# Patient Record
Sex: FEMALE | Race: WHITE | NOT HISPANIC OR LATINO
[De-identification: names, ages, dates, MRNs, and addresses within clinical notes are randomized per-mention and may not be internally consistent; named-entity substitution may affect disease eponyms.]

---

## 2021-04-20 PROBLEM — Z00.00 ENCOUNTER FOR PREVENTIVE HEALTH EXAMINATION: Status: ACTIVE | Noted: 2021-04-20

## 2021-05-07 ENCOUNTER — APPOINTMENT (OUTPATIENT)
Dept: PEDIATRIC MEDICAL GENETICS | Facility: CLINIC | Age: 31
End: 2021-05-07
Payer: COMMERCIAL

## 2021-05-07 DIAGNOSIS — Z87.42 PERSONAL HISTORY OF OTHER DISEASES OF THE FEMALE GENITAL TRACT: ICD-10-CM

## 2021-05-07 DIAGNOSIS — Z14.8 GENETIC CARRIER OF OTHER DISEASE: ICD-10-CM

## 2021-05-07 PROCEDURE — ZZZZZ: CPT

## 2021-05-14 PROBLEM — Z87.42 HISTORY OF MENORRHAGIA: Status: RESOLVED | Noted: 2021-05-14 | Resolved: 2021-05-14

## 2021-05-14 PROBLEM — Z14.8 CARRIER OF GENETIC DISORDER: Status: ACTIVE | Noted: 2021-05-14

## 2022-11-17 ENCOUNTER — OUTPATIENT (OUTPATIENT)
Dept: OUTPATIENT SERVICES | Facility: HOSPITAL | Age: 32
LOS: 1 days | End: 2022-11-17

## 2022-11-17 ENCOUNTER — APPOINTMENT (OUTPATIENT)
Dept: HUMAN REPRODUCTION | Facility: CLINIC | Age: 32
End: 2022-11-17

## 2022-11-17 ENCOUNTER — APPOINTMENT (OUTPATIENT)
Dept: MAMMOGRAPHY | Facility: CLINIC | Age: 32
End: 2022-11-17

## 2022-11-17 DIAGNOSIS — Z00.00 ENCOUNTER FOR GENERAL ADULT MEDICAL EXAMINATION WITHOUT ABNORMAL FINDINGS: ICD-10-CM

## 2023-04-12 ENCOUNTER — TRANSCRIPTION ENCOUNTER (OUTPATIENT)
Age: 33
End: 2023-04-12

## 2023-07-12 ENCOUNTER — TRANSCRIPTION ENCOUNTER (OUTPATIENT)
Age: 33
End: 2023-07-12

## 2023-07-13 ENCOUNTER — INPATIENT (INPATIENT)
Facility: HOSPITAL | Age: 33
LOS: 1 days | Discharge: HOME | End: 2023-07-15
Attending: OBSTETRICS & GYNECOLOGY | Admitting: OBSTETRICS & GYNECOLOGY
Payer: COMMERCIAL

## 2023-07-13 VITALS — HEART RATE: 82 BPM | OXYGEN SATURATION: 100 %

## 2023-07-13 DIAGNOSIS — Z34.80 ENCOUNTER FOR SUPERVISION OF OTHER NORMAL PREGNANCY, UNSPECIFIED TRIMESTER: ICD-10-CM

## 2023-07-13 DIAGNOSIS — O26.899 OTHER SPECIFIED PREGNANCY RELATED CONDITIONS, UNSPECIFIED TRIMESTER: ICD-10-CM

## 2023-07-13 LAB
BASOPHILS # BLD AUTO: 0 K/UL — SIGNIFICANT CHANGE UP (ref 0–0.2)
BASOPHILS NFR BLD AUTO: 0 % — SIGNIFICANT CHANGE UP (ref 0–2)
EOSINOPHIL # BLD AUTO: 0.09 K/UL — SIGNIFICANT CHANGE UP (ref 0–0.5)
EOSINOPHIL NFR BLD AUTO: 0.8 % — SIGNIFICANT CHANGE UP (ref 0–6)
HCT VFR BLD CALC: 36.6 % — SIGNIFICANT CHANGE UP (ref 34.5–45)
HGB BLD-MCNC: 12.7 G/DL — SIGNIFICANT CHANGE UP (ref 11.5–15.5)
LYMPHOCYTES # BLD AUTO: 27.6 % — SIGNIFICANT CHANGE UP (ref 13–44)
LYMPHOCYTES # BLD AUTO: 3.09 K/UL — SIGNIFICANT CHANGE UP (ref 1–3.3)
MANUAL SMEAR VERIFICATION: SIGNIFICANT CHANGE UP
MCHC RBC-ENTMCNC: 32.2 PG — SIGNIFICANT CHANGE UP (ref 27–34)
MCHC RBC-ENTMCNC: 34.7 GM/DL — SIGNIFICANT CHANGE UP (ref 32–36)
MCV RBC AUTO: 92.7 FL — SIGNIFICANT CHANGE UP (ref 80–100)
METAMYELOCYTES # FLD: 0.9 % — HIGH (ref 0–0)
MONOCYTES # BLD AUTO: 0.96 K/UL — HIGH (ref 0–0.9)
MONOCYTES NFR BLD AUTO: 8.6 % — SIGNIFICANT CHANGE UP (ref 2–14)
NEUTROPHILS # BLD AUTO: 6.94 K/UL — SIGNIFICANT CHANGE UP (ref 1.8–7.4)
NEUTROPHILS NFR BLD AUTO: 62.1 % — SIGNIFICANT CHANGE UP (ref 43–77)
PLAT MORPH BLD: NORMAL — SIGNIFICANT CHANGE UP
PLATELET # BLD AUTO: 155 K/UL — SIGNIFICANT CHANGE UP (ref 150–400)
RBC # BLD: 3.95 M/UL — SIGNIFICANT CHANGE UP (ref 3.8–5.2)
RBC # FLD: 12.3 % — SIGNIFICANT CHANGE UP (ref 10.3–14.5)
RBC BLD AUTO: SIGNIFICANT CHANGE UP
T PALLIDUM AB TITR SER: NEGATIVE — SIGNIFICANT CHANGE UP
WBC # BLD: 11.18 K/UL — HIGH (ref 3.8–10.5)
WBC # FLD AUTO: 11.18 K/UL — HIGH (ref 3.8–10.5)

## 2023-07-13 PROCEDURE — 74018 RADEX ABDOMEN 1 VIEW: CPT | Mod: 26

## 2023-07-13 RX ORDER — HYDROMORPHONE HYDROCHLORIDE 2 MG/ML
30 INJECTION INTRAMUSCULAR; INTRAVENOUS; SUBCUTANEOUS
Refills: 0 | Status: DISCONTINUED | OUTPATIENT
Start: 2023-07-13 | End: 2023-07-14

## 2023-07-13 RX ORDER — KETOROLAC TROMETHAMINE 30 MG/ML
30 SYRINGE (ML) INJECTION EVERY 6 HOURS
Refills: 0 | Status: DISCONTINUED | OUTPATIENT
Start: 2023-07-13 | End: 2023-07-14

## 2023-07-13 RX ORDER — SODIUM CHLORIDE 9 MG/ML
1000 INJECTION, SOLUTION INTRAVENOUS
Refills: 0 | Status: DISCONTINUED | OUTPATIENT
Start: 2023-07-13 | End: 2023-07-13

## 2023-07-13 RX ORDER — CITRIC ACID/SODIUM CITRATE 300-500 MG
15 SOLUTION, ORAL ORAL EVERY 6 HOURS
Refills: 0 | Status: DISCONTINUED | OUTPATIENT
Start: 2023-07-13 | End: 2023-07-13

## 2023-07-13 RX ORDER — MAGNESIUM HYDROXIDE 400 MG/1
30 TABLET, CHEWABLE ORAL
Refills: 0 | Status: DISCONTINUED | OUTPATIENT
Start: 2023-07-13 | End: 2023-07-15

## 2023-07-13 RX ORDER — DIPHENHYDRAMINE HCL 50 MG
25 CAPSULE ORAL EVERY 6 HOURS
Refills: 0 | Status: DISCONTINUED | OUTPATIENT
Start: 2023-07-13 | End: 2023-07-15

## 2023-07-13 RX ORDER — IBUPROFEN 200 MG
600 TABLET ORAL EVERY 6 HOURS
Refills: 0 | Status: COMPLETED | OUTPATIENT
Start: 2023-07-13 | End: 2024-06-10

## 2023-07-13 RX ORDER — TETANUS TOXOID, REDUCED DIPHTHERIA TOXOID AND ACELLULAR PERTUSSIS VACCINE, ADSORBED 5; 2.5; 8; 8; 2.5 [IU]/.5ML; [IU]/.5ML; UG/.5ML; UG/.5ML; UG/.5ML
0.5 SUSPENSION INTRAMUSCULAR ONCE
Refills: 0 | Status: DISCONTINUED | OUTPATIENT
Start: 2023-07-13 | End: 2023-07-15

## 2023-07-13 RX ORDER — ACETAMINOPHEN 500 MG
975 TABLET ORAL
Refills: 0 | Status: DISCONTINUED | OUTPATIENT
Start: 2023-07-13 | End: 2023-07-15

## 2023-07-13 RX ORDER — SIMETHICONE 80 MG/1
80 TABLET, CHEWABLE ORAL EVERY 4 HOURS
Refills: 0 | Status: DISCONTINUED | OUTPATIENT
Start: 2023-07-13 | End: 2023-07-15

## 2023-07-13 RX ORDER — LANOLIN
1 OINTMENT (GRAM) TOPICAL EVERY 6 HOURS
Refills: 0 | Status: DISCONTINUED | OUTPATIENT
Start: 2023-07-13 | End: 2023-07-15

## 2023-07-13 RX ORDER — OXYCODONE HYDROCHLORIDE 5 MG/1
5 TABLET ORAL
Refills: 0 | Status: COMPLETED | OUTPATIENT
Start: 2023-07-13 | End: 2023-07-20

## 2023-07-13 RX ORDER — CEFAZOLIN SODIUM 1 G
2000 VIAL (EA) INJECTION EVERY 8 HOURS
Refills: 0 | Status: COMPLETED | OUTPATIENT
Start: 2023-07-13 | End: 2023-07-14

## 2023-07-13 RX ORDER — OXYTOCIN 10 UNIT/ML
333.33 VIAL (ML) INJECTION
Qty: 20 | Refills: 0 | Status: DISCONTINUED | OUTPATIENT
Start: 2023-07-13 | End: 2023-07-15

## 2023-07-13 RX ORDER — HEPARIN SODIUM 5000 [USP'U]/ML
5000 INJECTION INTRAVENOUS; SUBCUTANEOUS EVERY 12 HOURS
Refills: 0 | Status: DISCONTINUED | OUTPATIENT
Start: 2023-07-13 | End: 2023-07-15

## 2023-07-13 RX ORDER — CHLORHEXIDINE GLUCONATE 213 G/1000ML
1 SOLUTION TOPICAL DAILY
Refills: 0 | Status: DISCONTINUED | OUTPATIENT
Start: 2023-07-13 | End: 2023-07-13

## 2023-07-13 RX ADMIN — Medication 975 MILLIGRAM(S): at 23:21

## 2023-07-13 RX ADMIN — Medication 30 MILLIGRAM(S): at 21:56

## 2023-07-13 RX ADMIN — Medication 100 MILLIGRAM(S): at 21:57

## 2023-07-13 RX ADMIN — Medication 30 MILLIGRAM(S): at 15:44

## 2023-07-13 RX ADMIN — Medication 0.25 MILLIGRAM(S): at 05:35

## 2023-07-13 RX ADMIN — HYDROMORPHONE HYDROCHLORIDE 30 MILLILITER(S): 2 INJECTION INTRAMUSCULAR; INTRAVENOUS; SUBCUTANEOUS at 08:09

## 2023-07-13 RX ADMIN — HEPARIN SODIUM 5000 UNIT(S): 5000 INJECTION INTRAVENOUS; SUBCUTANEOUS at 15:51

## 2023-07-13 RX ADMIN — Medication 30 MILLIGRAM(S): at 15:14

## 2023-07-13 RX ADMIN — Medication 100 MILLIGRAM(S): at 14:29

## 2023-07-13 RX ADMIN — Medication 30 MILLIGRAM(S): at 22:26

## 2023-07-13 RX ADMIN — Medication 975 MILLIGRAM(S): at 23:51

## 2023-07-13 RX ADMIN — HYDROMORPHONE HYDROCHLORIDE 30 MILLILITER(S): 2 INJECTION INTRAMUSCULAR; INTRAVENOUS; SUBCUTANEOUS at 12:55

## 2023-07-13 RX ADMIN — Medication 0.25 MILLIGRAM(S): at 05:33

## 2023-07-13 RX ADMIN — Medication 30 MILLIGRAM(S): at 09:28

## 2023-07-13 RX ADMIN — Medication 975 MILLIGRAM(S): at 18:09

## 2023-07-13 RX ADMIN — Medication 30 MILLIGRAM(S): at 10:15

## 2023-07-13 RX ADMIN — Medication 975 MILLIGRAM(S): at 17:39

## 2023-07-13 NOTE — OB NEONATOLOGY/PEDIATRICIAN DELIVERY SUMMARY - NSPEDSNEONOTESA_OBGYN_ALL_OB_FT
[facility] called to OR for emergency CS due to fetal bradycardia. As per OR RN report: 39.3 wk AGA male born via CS on 2023@0545 to a 34 y/o  mother. Maternal labs include Blood Type O-, HIV - , RPR NR , Rubella I , Hep B - , GBS+ 2023 (not treated, precipitous delivery). SROM at 0515 with clear fluids (ROM hours:0H30M). Baby emerged vigorous, crying, was warmed, dried suctioned and stimulated with APGARS of 8/9. Mom plans to initiate breastfeeding/ formula feed, consents Hep B vaccine and consents circ.  Highest maternal temp: 36.7 C. EOS 0.06. Admitted under Dr. Aguilera. Outpatient PMD: [place], [place], [facility].      Physical Exam:  Gen: no acute distress, +grimace  HEENT:  anterior fontanel open soft and flat, nondysmorphic facies, no cleft lip/palate, ears normal set, no ear pits or tags, nares clinically patent  Resp: Normal respiratory effort without grunting or retractions, good air entry b/l, clear to auscultation bilaterally  Cardio: Present S1/S2, regular rate and rhythm, no murmurs  Abd: soft, non tender, non distended, umbilical cord with 3 vessels  Neuro: +palmar and plantar grasp, +suck, +[place], normal tone  Extremities/musculoskeletal: negative Healy and Ortolani maneuvers, moving all extremities, no clavicular crepitus or stepoff  Skin: pink, warm, +right nostril small abrasion  Genitals: Normal male anatomy, testicles palpable in scrotum b/l, +hydrocele b/l, Alexey 1, anus patent

## 2023-07-13 NOTE — OB PROVIDER DELIVERY SUMMARY - NSSELHIDDEN_OBGYN_ALL_OB_FT
[NS_DeliveryAttending1_OBGYN_ALL_OB_FT:[email or web address]=],[NS_DeliveryAttending2_OBGYN_ALL_OB_FT:[id number]=],[NS_DeliveryRN_OBGYN_ALL_OB_FT:[id number]=],[NS_DeliveryAssist2_OBGYN_ALL_OB_FT:[id number]=]

## 2023-07-13 NOTE — OB PROVIDER H&P - NSHPPHYSICALEXAM_GEN_ALL_CORE
Vital Signs Last 24 Hrs  T(C): --  T(F): --  HR: 82 (13 Jul 2023 06:00) (75 - 82)  BP: --  BP(mean): --  RR: --  SpO2: 100% (13 Jul 2023 06:00) (78% - 100%)

## 2023-07-13 NOTE — OB RN INTRAOPERATIVE NOTE - NSSELHIDDEN_OBGYN_ALL_OB_FT
[NS_DeliveryAttending1_OBGYN_ALL_OB_FT:[id number]=],[NS_DeliveryAttending2_OBGYN_ALL_OB_FT:[id number]=],[NS_DeliveryRN_OBGYN_ALL_OB_FT:[id number]=] [NS_DeliveryAttending1_OBGYN_ALL_OB_FT:[email or web address]=],[NS_DeliveryAttending2_OBGYN_ALL_OB_FT:[id number]=],[NS_DeliveryRN_OBGYN_ALL_OB_FT:[id number]=],[NS_DeliveryAssist2_OBGYN_ALL_OB_FT:[id number]=]

## 2023-07-13 NOTE — OB RN DELIVERY SUMMARY - BABY A: APGAR 1 MIN MUSCLE TONE, DELIVERY
normal tone, no external hemorrhoids, no masses palpable, no red blood, Tenderness on DENITA, Internal hemorrhoids present (2) well flexed

## 2023-07-13 NOTE — OB PROVIDER DELIVERY SUMMARY - NSPROVIDERDELIVERYNOTE_OBGYN_ALL_OB_FT
emergent pLTCS under GETA for prolonged bradycardia     Viable male infant. APGARS 8/9. 2555g  Grossly normal uterus, bilateral tubes, and ovaries  Hysterotomy closed in x1 layer w/ 1-0 PDS ***  Bladder backfilled w/ 150cc of Methylene blue w/ no extravasation noted   Peritoneum incorporating rectus muscle was reapproximated w/ 2-0 Vicryl   Fascia reapproximated w/ 1-0 Vicryl in a running fashion   SubQ reapproximated w/ 2-0 Vicryl in a running fashion w/ a deep-dermal layer   Skin reapproximated w/ Quill  in a subcuticular fashion     _/_/_  - Patient additionally given IM Methergine x1     Of note, count was correct x2. However, intra-op x-ray was obtained given emergent nature. X-ray clear per radiology  Dictation #: emergent pLTCS under GETA for prolonged bradycardia   - Delivery of the  was done by service attending, Dr. Venegas given emergent nature. Primary attending, Dr. ABNER Miller was presented and took over case after  had been delivered     Viable male infant. APGARS 8/9. 2555g  Hysterotomy closed in x1 layer w/ 1-0 PDS  Bladder backfilled w/ 150cc of Methylene blue w/ no extravasation noted   Peritoneum incorporating rectus muscle was reapproximated w/ 2-0 Vicryl   Fascia reapproximated w/ 1-0 Vicryl in a running fashion   SubQ reapproximated w/ 2-0 Vicryl in a running fashion w/ a deep-dermal layer   Skin reapproximated w/ Quill  in a subcuticular fashion     800/2200/150    - Patient additionally given IM Methergine x1     Of note, count was correct x2. However, intra-op x-ray was obtained given emergent nature. X-ray clear per radiology    Dictation #:

## 2023-07-13 NOTE — OB RN DELIVERY SUMMARY - NSSELHIDDEN_OBGYN_ALL_OB_FT
[NS_DeliveryAttending1_OBGYN_ALL_OB_FT:[email or web address]=],[NS_DeliveryAttending2_OBGYN_ALL_OB_FT:[id number]=],[NS_DeliveryRN_OBGYN_ALL_OB_FT:[id number]=]

## 2023-07-13 NOTE — OB PROVIDER H&P - ASSESSMENT
32yo  @39w3d presenting for active labor. Minutes after arrival, patient had a 7 min prolonged decel to 60bpm   - ISE placed  - Terbutaline x2 administered  - Patient placed on hands and knees  - Patient instructed to push and made minimal progress  - Patient taken for emergent  section under general anesthesia  - See delivery note for details    Maty Mayes, PGY2  Patient seen and examined with Dr. Venegas and Georgette Oconnell, PGY4   d/w Dr. Pace

## 2023-07-13 NOTE — OB PROVIDER LABOR PROGRESS NOTE - NS_SUBJECTIVE/OBJECTIVE_OBGYN_ALL_OB_FT
:  34yo P0 pt of Dr Erazo's presented with urge to push. Examined and found to be FD@-1 station. Dr garcia informed and en route.  When she was placed on EFM, large decelerations were noted with persistent, painful, unyielding cntrx pain. FSE applied. 2 doses of SQ terb given to break cntrx pattern and pt placed in knee chest position. Despite multiple resuscitative measures, fetal heart rate remained down. pt stated that she needed to push, but being a P0 was unable to bring the fetal vertex down rapidly enough to deliver with fetal bradycardia. I therefore decided to proceed with emergent primary C/S. Pt rushed to OR and after GA induction, an emergent L/T C/S was performed with delivery of a vigorous live baby boy covered in dark red blood, handed to awaiting peds, Apgars 8,9. Placenta delivered manually with a large amount of port wine blood delivering after placenta. Uterus was then closed in 1 layer with 1 caprosyn. At this point Dr Garcia scrubbed in and completed the closure. There was no instrument count because of the emergent nature of the C/S, so an XRay will be performed at the conclusion of the surgery.

## 2023-07-13 NOTE — OB PROVIDER H&P - HISTORY OF PRESENT ILLNESS
32yo  @39w3d  p/f ROL. She began delfino at 3:30am. She felt a gush of fluid at 5:15am. +VB, +FM. She is delfino uncomfortably on arrival  PNC: [facility]  GBS: pos  EFW: 8#  ObHx:   GynHx: -c/f/STI/abnl pap  MedHx: denies  SrgHx: denies  PsychHx: denies  SocialHx: denies T/E/D  AllergyHx: denies  RxHx: denies

## 2023-07-14 LAB
BASOPHILS # BLD AUTO: 0.03 K/UL — SIGNIFICANT CHANGE UP (ref 0–0.2)
BASOPHILS NFR BLD AUTO: 0.2 % — SIGNIFICANT CHANGE UP (ref 0–2)
EOSINOPHIL # BLD AUTO: 0.05 K/UL — SIGNIFICANT CHANGE UP (ref 0–0.5)
EOSINOPHIL NFR BLD AUTO: 0.4 % — SIGNIFICANT CHANGE UP (ref 0–6)
HCT VFR BLD CALC: 25.8 % — LOW (ref 34.5–45)
HGB BLD-MCNC: 8.9 G/DL — LOW (ref 11.5–15.5)
IMM GRANULOCYTES NFR BLD AUTO: 0.8 % — SIGNIFICANT CHANGE UP (ref 0–0.9)
KLEIHAUER-BETKE CALCULATION: 0 % — SIGNIFICANT CHANGE UP (ref 0–0.3)
LYMPHOCYTES # BLD AUTO: 16.6 % — SIGNIFICANT CHANGE UP (ref 13–44)
LYMPHOCYTES # BLD AUTO: 2.14 K/UL — SIGNIFICANT CHANGE UP (ref 1–3.3)
MCHC RBC-ENTMCNC: 32.4 PG — SIGNIFICANT CHANGE UP (ref 27–34)
MCHC RBC-ENTMCNC: 34.5 GM/DL — SIGNIFICANT CHANGE UP (ref 32–36)
MCV RBC AUTO: 93.8 FL — SIGNIFICANT CHANGE UP (ref 80–100)
MONOCYTES # BLD AUTO: 0.7 K/UL — SIGNIFICANT CHANGE UP (ref 0–0.9)
MONOCYTES NFR BLD AUTO: 5.4 % — SIGNIFICANT CHANGE UP (ref 2–14)
NEUTROPHILS # BLD AUTO: 9.87 K/UL — HIGH (ref 1.8–7.4)
NEUTROPHILS NFR BLD AUTO: 76.6 % — SIGNIFICANT CHANGE UP (ref 43–77)
NRBC # BLD: 0 /100 WBCS — SIGNIFICANT CHANGE UP (ref 0–0)
PLATELET # BLD AUTO: 118 K/UL — LOW (ref 150–400)
RBC # BLD: 2.75 M/UL — LOW (ref 3.8–5.2)
RBC # FLD: 12.5 % — SIGNIFICANT CHANGE UP (ref 10.3–14.5)
WBC # BLD: 12.89 K/UL — HIGH (ref 3.8–10.5)
WBC # FLD AUTO: 12.89 K/UL — HIGH (ref 3.8–10.5)

## 2023-07-14 RX ORDER — IBUPROFEN 200 MG
600 TABLET ORAL EVERY 6 HOURS
Refills: 0 | Status: DISCONTINUED | OUTPATIENT
Start: 2023-07-14 | End: 2023-07-15

## 2023-07-14 RX ORDER — ASCORBIC ACID 60 MG
500 TABLET,CHEWABLE ORAL THREE TIMES A DAY
Refills: 0 | Status: DISCONTINUED | OUTPATIENT
Start: 2023-07-14 | End: 2023-07-15

## 2023-07-14 RX ORDER — FERROUS SULFATE 325(65) MG
325 TABLET ORAL THREE TIMES A DAY
Refills: 0 | Status: DISCONTINUED | OUTPATIENT
Start: 2023-07-14 | End: 2023-07-15

## 2023-07-14 RX ADMIN — SIMETHICONE 80 MILLIGRAM(S): 80 TABLET, CHEWABLE ORAL at 07:01

## 2023-07-14 RX ADMIN — HYDROMORPHONE HYDROCHLORIDE 30 MILLILITER(S): 2 INJECTION INTRAMUSCULAR; INTRAVENOUS; SUBCUTANEOUS at 07:00

## 2023-07-14 RX ADMIN — Medication 30 MILLIGRAM(S): at 03:52

## 2023-07-14 RX ADMIN — Medication 975 MILLIGRAM(S): at 18:13

## 2023-07-14 RX ADMIN — Medication 325 MILLIGRAM(S): at 21:30

## 2023-07-14 RX ADMIN — Medication 100 MILLIGRAM(S): at 14:54

## 2023-07-14 RX ADMIN — Medication 600 MILLIGRAM(S): at 15:55

## 2023-07-14 RX ADMIN — Medication 975 MILLIGRAM(S): at 07:21

## 2023-07-14 RX ADMIN — Medication 600 MILLIGRAM(S): at 10:05

## 2023-07-14 RX ADMIN — Medication 500 MILLIGRAM(S): at 21:30

## 2023-07-14 RX ADMIN — Medication 975 MILLIGRAM(S): at 06:43

## 2023-07-14 RX ADMIN — Medication 600 MILLIGRAM(S): at 21:31

## 2023-07-14 RX ADMIN — Medication 600 MILLIGRAM(S): at 09:06

## 2023-07-14 RX ADMIN — Medication 325 MILLIGRAM(S): at 14:55

## 2023-07-14 RX ADMIN — HEPARIN SODIUM 5000 UNIT(S): 5000 INJECTION INTRAVENOUS; SUBCUTANEOUS at 03:22

## 2023-07-14 RX ADMIN — Medication 600 MILLIGRAM(S): at 22:00

## 2023-07-14 RX ADMIN — HEPARIN SODIUM 5000 UNIT(S): 5000 INJECTION INTRAVENOUS; SUBCUTANEOUS at 14:54

## 2023-07-14 RX ADMIN — Medication 30 MILLIGRAM(S): at 03:22

## 2023-07-14 RX ADMIN — Medication 600 MILLIGRAM(S): at 14:55

## 2023-07-14 RX ADMIN — Medication 975 MILLIGRAM(S): at 19:00

## 2023-07-14 RX ADMIN — Medication 975 MILLIGRAM(S): at 12:38

## 2023-07-14 RX ADMIN — Medication 500 MILLIGRAM(S): at 14:55

## 2023-07-14 RX ADMIN — Medication 100 MILLIGRAM(S): at 06:43

## 2023-07-14 RX ADMIN — Medication 975 MILLIGRAM(S): at 13:35

## 2023-07-14 NOTE — PROGRESS NOTE ADULT - SUBJECTIVE AND OBJECTIVE BOX
Day 1 of Anesthesia Pain Management Service    SUBJECTIVE: Doing well    Pain Scale Score:	[X] Refer to charted pain scores    THERAPY:    [ ] IV PCA Morphine		        [ ] 5 mg/mL	[ ] 1 mg/mL  [X] IV PCA Hydromorphone	[ ] 5 mg/mL	[X] 1 mg/mL  [ ] IV PCA Fentanyl		        [ ] 50 micrograms/mL    Demand dose: 0.2 mg     Lockout: 6 minutes   Continuous Rate: 0 mg/hr  4 Hour Limit: 4 mg    MEDICATIONS  (STANDING):  acetaminophen     Tablet .. 975 milliGRAM(s) Oral <User Schedule>  ascorbic acid 500 milliGRAM(s) Oral three times a day  ceFAZolin   IVPB 2000 milliGRAM(s) IV Intermittent every 8 hours  diphtheria/tetanus/pertussis (acellular) Vaccine (Adacel) 0.5 milliLiter(s) IntraMuscular once  ferrous    sulfate 325 milliGRAM(s) Oral three times a day  heparin   Injectable 5000 Unit(s) SubCutaneous every 12 hours  ibuprofen  Tablet. 600 milliGRAM(s) Oral every 6 hours  measles/mumps/rubella Vaccine 0.5 milliLiter(s) SubCutaneous once  oxytocin Infusion 333.333 milliUNIT(s)/Min (1000 mL/Hr) IV Continuous <Continuous>    MEDICATIONS  (PRN):  diphenhydrAMINE 25 milliGRAM(s) Oral every 6 hours PRN Pruritus  lanolin Ointment 1 Application(s) Topical every 6 hours PRN Sore Nipples  magnesium hydroxide Suspension 30 milliLiter(s) Oral two times a day PRN Constipation  oxyCODONE    IR 5 milliGRAM(s) Oral every 3 hours PRN Moderate to Severe Pain (4-10)  simethicone 80 milliGRAM(s) Chew every 4 hours PRN Gas      OBJECTIVE:    Sedation Score:	[ X] Alert	 [ ] Drowsy 	[ ] Arousable	[ ] Asleep	[ ] Unresponsive    Side Effects:	[X ] None	[ ] Nausea	[ ] Vomiting	[ ] Pruritus  		[ ] Other:    Vital Signs Last 24 Hrs  T(C): 36.9 (14 Jul 2023 06:22), Max: 37.4 (13 Jul 2023 13:30)  T(F): 98.5 (14 Jul 2023 06:22), Max: 99.3 (13 Jul 2023 13:30)  HR: 73 (14 Jul 2023 06:22) (73 - 85)  BP: 107/72 (14 Jul 2023 06:22) (107/72 - 129/80)  BP(mean): 92 (13 Jul 2023 11:20) (92 - 92)  RR: 18 (14 Jul 2023 06:22) (16 - 18)  SpO2: 99% (14 Jul 2023 06:22) (97% - 99%)    Parameters below as of 14 Jul 2023 06:22  Patient On (Oxygen Delivery Method): room air        ASSESSMENT/ PLAN    Therapy to  be:               [  ] Continued   [X ] Discontinued   [ X] Changed to PRN Analgesics    Documentation and Verification of current medications:   [X] Done	[ ] Not done, not eligible    Comments: PCA D\C'd. Transitioned to prn analgesics

## 2023-07-14 NOTE — PROGRESS NOTE ADULT - PROBLEM SELECTOR PLAN 1
#RH negative  check KB  for Rhogam    #Postpartum state  Increase OOB  PO Pain Protocol  DVT ppx  Regular diet  AM CBC  Routine Postpartum/Post-op care #RH negative, Baby RH positive  check KB  for Rhogam    #Postpartum state  Increase OOB  PO Pain Protocol  DVT ppx  Regular diet  AM CBC  Routine Postpartum/Post-op care

## 2023-07-14 NOTE — CHART NOTE - NSCHARTNOTEFT_GEN_A_CORE
PA NOTE        POD#1         Vital Signs Last 24 Hrs  T(C): 36.9 (2023 06:22), Max: 37.4 (2023 13:30)  T(F): 98.5 (2023 06:22), Max: 99.3 (2023 13:30)  HR: 73 (2023 06:22) (73 - 86)  BP: 107/72 (2023 06:22) (107/72 - 136/76)  BP(mean): 92 (2023 11:20) (91 - 100)  RR: 18 (2023 06:22) (16 - 18)  SpO2: 99% (2023 06:22) (97% - 100%)    Parameters below as of 2023 06:22  Patient On (Oxygen Delivery Method): room air                   8.9    12.89 )-----------( 118      ( 14 @ 06:42 )             25.8                12.7   11.18 )-----------( 155      ( 13 @ 05:51 )             36.6           Assessment: Anemia secondary to acute blood loss due to delivery    Plan:  - Ferrous Sulfate, Vitamin C supplementation.  - Monitor for signs/symptoms of anemia.   - Does not require transfusion at this time

## 2023-07-14 NOTE — PROGRESS NOTE ADULT - ATTENDING COMMENTS
Agree with assessment and plan. Pt doing well without complaints.   Vitals stable. Exam Benign  - iron supp for asymptomatic anemia  - Routine post partum care

## 2023-07-14 NOTE — PROGRESS NOTE ADULT - SUBJECTIVE AND OBJECTIVE BOX
Postpartum Note-  Section POD#1    Prenatal Labs  Blood type: O Negative  Rubella IgG: Immune  RPR: Negative          S:Patient w/o complaints, pain is controlled.  Pt is OOB, tolerating PO, passing flatus. Voiding. Lochia WNL.     O:  Vital Signs Last 24 Hrs  T(C): 36.9 (2023 06:22), Max: 37.4 (2023 13:30)  T(F): 98.5 (2023 06:22), Max: 99.3 (2023 13:30)  HR: 73 (2023 06:22) (73 - 120)  BP: 107/72 (2023 06:22) (107/72 - 136/76)  BP(mean): 92 (2023 11:20) (81 - 100)  RR: 18 (2023 06:22) (16 - 20)  SpO2: 99% (2023 06:22) (97% - 100%)    Parameters below as of 2023 06:22  Patient On (Oxygen Delivery Method): room air      I&O's Summary    2023 07:01  -  2023 07:00  --------------------------------------------------------  IN: 250 mL / OUT: 1400 mL / NET: -1150 mL        Gen: NAD  Abdomen: appropriate tenderness, softly distended, fundus firm.  Incision: Clean/dry/ intact. no erythema, no ecchymosis   Sub Q   Lochia WNL  Ext:Nontender    LABS:             12.7   11.18 )-----------( 155      ( 0713 @ 05:51 )             36.6

## 2023-07-15 ENCOUNTER — TRANSCRIPTION ENCOUNTER (OUTPATIENT)
Age: 33
End: 2023-07-15

## 2023-07-15 VITALS
OXYGEN SATURATION: 100 % | SYSTOLIC BLOOD PRESSURE: 126 MMHG | TEMPERATURE: 98 F | DIASTOLIC BLOOD PRESSURE: 86 MMHG | HEART RATE: 85 BPM | RESPIRATION RATE: 18 BRPM

## 2023-07-15 DIAGNOSIS — D62 ACUTE POSTHEMORRHAGIC ANEMIA: ICD-10-CM

## 2023-07-15 DIAGNOSIS — M54.9 DORSALGIA, UNSPECIFIED: ICD-10-CM

## 2023-07-15 PROCEDURE — C9399: CPT

## 2023-07-15 PROCEDURE — 85025 COMPLETE CBC W/AUTO DIFF WBC: CPT

## 2023-07-15 PROCEDURE — 86780 TREPONEMA PALLIDUM: CPT

## 2023-07-15 PROCEDURE — 74018 RADEX ABDOMEN 1 VIEW: CPT

## 2023-07-15 PROCEDURE — 59025 FETAL NON-STRESS TEST: CPT

## 2023-07-15 PROCEDURE — 59050 FETAL MONITOR W/REPORT: CPT

## 2023-07-15 PROCEDURE — 86850 RBC ANTIBODY SCREEN: CPT

## 2023-07-15 PROCEDURE — 86900 BLOOD TYPING SEROLOGIC ABO: CPT

## 2023-07-15 PROCEDURE — 85460 HEMOGLOBIN FETAL: CPT

## 2023-07-15 PROCEDURE — 36415 COLL VENOUS BLD VENIPUNCTURE: CPT

## 2023-07-15 PROCEDURE — 86901 BLOOD TYPING SEROLOGIC RH(D): CPT

## 2023-07-15 PROCEDURE — 90707 MMR VACCINE SC: CPT

## 2023-07-15 RX ORDER — OXYCODONE HYDROCHLORIDE 5 MG/1
1 TABLET ORAL
Qty: 5 | Refills: 0
Start: 2023-07-15 | End: 2023-07-16

## 2023-07-15 RX ORDER — OXYCODONE HYDROCHLORIDE 5 MG/1
5 TABLET ORAL
Refills: 0 | Status: DISCONTINUED | OUTPATIENT
Start: 2023-07-15 | End: 2023-07-15

## 2023-07-15 RX ORDER — IBUPROFEN 200 MG
1 TABLET ORAL
Qty: 0 | Refills: 0 | DISCHARGE
Start: 2023-07-15

## 2023-07-15 RX ORDER — ACETAMINOPHEN 500 MG
3 TABLET ORAL
Qty: 0 | Refills: 0 | DISCHARGE
Start: 2023-07-15

## 2023-07-15 RX ADMIN — OXYCODONE HYDROCHLORIDE 5 MILLIGRAM(S): 5 TABLET ORAL at 15:15

## 2023-07-15 RX ADMIN — Medication 600 MILLIGRAM(S): at 04:15

## 2023-07-15 RX ADMIN — Medication 0.5 MILLILITER(S): at 17:10

## 2023-07-15 RX ADMIN — Medication 500 MILLIGRAM(S): at 06:12

## 2023-07-15 RX ADMIN — Medication 975 MILLIGRAM(S): at 07:00

## 2023-07-15 RX ADMIN — OXYCODONE HYDROCHLORIDE 5 MILLIGRAM(S): 5 TABLET ORAL at 12:15

## 2023-07-15 RX ADMIN — Medication 975 MILLIGRAM(S): at 01:00

## 2023-07-15 RX ADMIN — Medication 600 MILLIGRAM(S): at 16:00

## 2023-07-15 RX ADMIN — Medication 500 MILLIGRAM(S): at 15:10

## 2023-07-15 RX ADMIN — Medication 600 MILLIGRAM(S): at 09:20

## 2023-07-15 RX ADMIN — OXYCODONE HYDROCHLORIDE 5 MILLIGRAM(S): 5 TABLET ORAL at 07:45

## 2023-07-15 RX ADMIN — OXYCODONE HYDROCHLORIDE 5 MILLIGRAM(S): 5 TABLET ORAL at 11:29

## 2023-07-15 RX ADMIN — Medication 975 MILLIGRAM(S): at 11:29

## 2023-07-15 RX ADMIN — Medication 600 MILLIGRAM(S): at 15:10

## 2023-07-15 RX ADMIN — OXYCODONE HYDROCHLORIDE 5 MILLIGRAM(S): 5 TABLET ORAL at 07:09

## 2023-07-15 RX ADMIN — HEPARIN SODIUM 5000 UNIT(S): 5000 INJECTION INTRAVENOUS; SUBCUTANEOUS at 15:10

## 2023-07-15 RX ADMIN — Medication 600 MILLIGRAM(S): at 03:34

## 2023-07-15 RX ADMIN — Medication 325 MILLIGRAM(S): at 15:10

## 2023-07-15 RX ADMIN — Medication 325 MILLIGRAM(S): at 06:12

## 2023-07-15 RX ADMIN — Medication 975 MILLIGRAM(S): at 00:15

## 2023-07-15 RX ADMIN — Medication 600 MILLIGRAM(S): at 08:21

## 2023-07-15 RX ADMIN — Medication 975 MILLIGRAM(S): at 12:15

## 2023-07-15 RX ADMIN — Medication 975 MILLIGRAM(S): at 06:12

## 2023-07-15 RX ADMIN — OXYCODONE HYDROCHLORIDE 5 MILLIGRAM(S): 5 TABLET ORAL at 16:00

## 2023-07-15 RX ADMIN — HEPARIN SODIUM 5000 UNIT(S): 5000 INJECTION INTRAVENOUS; SUBCUTANEOUS at 03:35

## 2023-07-15 NOTE — PROGRESS NOTE ADULT - ASSESSMENT
Pain Management Attending Addendum    SUBJECTIVE: Patient doing well with IV PCA    Therapy:    [X] IV PCA         [ ] PRN Analgesics    OBJECTIVE:   [X] Pain appropriately controlled    [ ] Other:    Side Effects:  [X] None	             [ ] Nausea              [ ] Pruritis                	[ ] Other:    ASSESSMENT/PLAN:  Therapy changed to PRN analgesics    Comments:
A/P:  33y  POD #2 S/P  primary   section for category 2 FHT under GETA  Doing well
A/P:  33y  POD # 1 S/P  primary   section for category 2 FHT under GETA  Doing well

## 2023-07-15 NOTE — DISCHARGE NOTE OB - NS MD DC FALL RISK RISK
For information on Fall & Injury Prevention, visit: [email or web address] OR  [email or web address] OR  https://www.cdc.gov/steadi/patient.html

## 2023-07-15 NOTE — DISCHARGE NOTE OB - CARE PROVIDER_API CALL
Willi Pace  Obstetrics and Gynecology  [address], [address]  [place] [address]  Phone: [phone]  Fax: [phone]  Follow Up Time:

## 2023-07-15 NOTE — PROGRESS NOTE ADULT - SUBJECTIVE AND OBJECTIVE BOX
Postpartum Note-  Section POD#2    Prenatal Labs  Blood type: O Negative  Rubella IgG: Immune  RPR: Negative          S:Patient c/o mid-back pain. Otherwise, pain is controlled.  Pt is OOB, tolerating PO, passing flatus. Voiding. Lochia WNL.     O:  Vital Signs Last 24 Hrs  T(C): 36.9 (15 Jul 2023 05:45), Max: 37.1 (2023 21:04)  T(F): 98.5 (15 Jul 2023 05:45), Max: 98.8 (2023 21:04)  HR: 92 (15 Jul 2023 05:45) (80 - 92)  BP: 127/87 (15 Jul 2023 05:45) (107/72 - 127/87)  BP(mean): --  RR: 18 (15 Jul 2023 05:45) (18 - 18)  SpO2: 99% (15 Jul 2023 05:45) (97% - 99%)    Parameters below as of 15 Jul 2023 05:45  Patient On (Oxygen Delivery Method): room air        Gen: NAD  Abdomen: appropriate tenderness, softly distended, fundus firm.  Incision: Clean/dry/ intact. no erythema, no ecchymosis   Sub Q   Back: non-tender to palpation, Neg ecchymosis/edema/ erythema   Lochia WNL  Ext:Nontender    LABS:                          8.9    12.89 )-----------( 118      ( 2023 06:42 )             25.8                12.7   11.18 )-----------( 155      ( 07-13 @ 05:51 )             36.6       Kleihauer-Betke Calculation: 0.0: Fetal cells Fetomaternal hemorrhage Recommended                  Postpartum Note-  Section POD#2    Prenatal Labs  Blood type: O Negative  Rubella IgG: Immune  RPR: Negative          S:Patient c/o mid-back pain. Otherwise, pain is controlled.  Pt is OOB, tolerating PO, passing flatus. Voiding. Lochia WNL.     O:  Vital Signs Last 24 Hrs  T(C): 36.9 (15 Jul 2023 05:45), Max: 37.1 (2023 21:04)  T(F): 98.5 (15 Jul 2023 05:45), Max: 98.8 (2023 21:04)  HR: 92 (15 Jul 2023 05:45) (80 - 92)  BP: 127/87 (15 Jul 2023 05:45) (107/72 - 127/87)  BP(mean): --  RR: 18 (15 Jul 2023 05:45) (18 - 18)  SpO2: 99% (15 Jul 2023 05:45) (97% - 99%)    Parameters below as of 15 Jul 2023 05:45  Patient On (Oxygen Delivery Method): room air        Gen: NAD  Abdomen: appropriate tenderness, softly distended, fundus firm.  Incision: Clean/dry/ intact. no erythema, no ecchymosis   Sub Q   Back: non-tender to palpation, Neg ecchymosis/edema/ erythema   Lochia WNL  Ext:Nontender    LABS:                          8.9    12.89 )-----------( 118      ( 2023 06:42 )             25.8                12.7   11.18 )-----------( 155      ( 07-13 @ 05:51 )             36.6       Kleihauer-Betke Calculation: 0.0: Fetal cells

## 2023-07-15 NOTE — PROGRESS NOTE ADULT - PROBLEM SELECTOR PLAN 1
#RH negative, Baby RH positive  KB= 0  for Rhogam    #Postpartum state  Increase OOB  PO Pain Protocol  DVT ppx  Regular diet  Routine Postpartum/Post-op care

## 2023-07-15 NOTE — DISCHARGE NOTE OB - HOSPITAL COURSE
Patient had uncomplicated low transverse  section.  Please see operative note for details.  During postpartum course patient's vitals were stable, vaginal bleeding appropriate, and pain well controlled.  Post operation day one hematocrit was appropriate.  On day of discharge patient was ambulating, her pain controlled with oral medications, had adequate oral intake, and was voiding freely.  Discharge instructions and precautions were given.  Will return to the office in 2-3 weeks for incision check

## 2023-07-15 NOTE — DISCHARGE NOTE OB - PATIENT PORTAL LINK FT
You can access the FollowMyHealth Patient Portal offered by [facility] by registering at the following website: [email or web address]. By joining [facility]’s FollowMyHealth portal, you will also be able to view your health information using other applications (apps) compatible with our system.

## 2023-07-15 NOTE — PROGRESS NOTE ADULT - PROBLEM SELECTOR PLAN 2
anemia due to blood loss from c/sec- asymptomatic, not requiring transfusion  Fe, Vitamin C supplementation  Monitor for signs/symptoms of anemia

## 2023-07-15 NOTE — DISCHARGE NOTE OB - MEDICATION SUMMARY - MEDICATIONS TO TAKE
I will START or STAY ON the medications listed below when I get home from the hospital:    ibuprofen 600 mg oral tablet  -- 1 tab(s) by mouth every 6 hours  -- Indication: For Pain    acetaminophen 325 mg oral tablet  -- 3 tab(s) by mouth every 6 hours  -- Indication: For Pain   I will START or STAY ON the medications listed below when I get home from the hospital:    ibuprofen 600 mg oral tablet  -- 1 tab(s) by mouth every 6 hours  -- Indication: For Pain    acetaminophen 325 mg oral tablet  -- 3 tab(s) by mouth every 6 hours  -- Indication: For Pain    oxyCODONE 5 mg oral tablet  -- 1 tab(s) by mouth every 6 hours MDD: 4  -- Indication: For Pain

## 2024-03-11 ENCOUNTER — RESULT REVIEW (OUTPATIENT)
Age: 34
End: 2024-03-11

## 2025-03-20 ENCOUNTER — RESULT REVIEW (OUTPATIENT)
Age: 35
End: 2025-03-20